# Patient Record
Sex: MALE | Race: WHITE | NOT HISPANIC OR LATINO | Employment: FULL TIME | URBAN - NONMETROPOLITAN AREA
[De-identification: names, ages, dates, MRNs, and addresses within clinical notes are randomized per-mention and may not be internally consistent; named-entity substitution may affect disease eponyms.]

---

## 2019-10-03 RX ORDER — LOSARTAN POTASSIUM 25 MG/1
25 TABLET ORAL DAILY
COMMUNITY

## 2019-10-03 RX ORDER — TRAMADOL HYDROCHLORIDE 50 MG/1
50 TABLET ORAL EVERY 6 HOURS PRN
COMMUNITY

## 2019-10-07 ENCOUNTER — ANESTHESIA (OUTPATIENT)
Dept: GASTROENTEROLOGY | Facility: HOSPITAL | Age: 59
End: 2019-10-07

## 2019-10-07 ENCOUNTER — HOSPITAL ENCOUNTER (OUTPATIENT)
Facility: HOSPITAL | Age: 59
Setting detail: HOSPITAL OUTPATIENT SURGERY
Discharge: HOME | End: 2019-10-07
Attending: INTERNAL MEDICINE | Admitting: INTERNAL MEDICINE

## 2019-10-07 ENCOUNTER — ANESTHESIA EVENT (OUTPATIENT)
Dept: GASTROENTEROLOGY | Facility: HOSPITAL | Age: 59
End: 2019-10-07

## 2019-10-07 VITALS
HEIGHT: 68 IN | WEIGHT: 164 LBS | TEMPERATURE: 97.2 F | DIASTOLIC BLOOD PRESSURE: 73 MMHG | BODY MASS INDEX: 24.86 KG/M2 | HEART RATE: 65 BPM | RESPIRATION RATE: 18 BRPM | SYSTOLIC BLOOD PRESSURE: 120 MMHG | OXYGEN SATURATION: 98 %

## 2019-10-07 DIAGNOSIS — Z12.11 COLON CANCER SCREENING: ICD-10-CM

## 2019-10-07 PROCEDURE — 25010000002 PROPOFOL 10 MG/ML EMULSION: Performed by: NURSE ANESTHETIST, CERTIFIED REGISTERED

## 2019-10-07 PROCEDURE — 88305 TISSUE EXAM BY PATHOLOGIST: CPT | Performed by: INTERNAL MEDICINE

## 2019-10-07 PROCEDURE — 88305 TISSUE EXAM BY PATHOLOGIST: CPT | Performed by: PATHOLOGY

## 2019-10-07 RX ORDER — PROPOFOL 10 MG/ML
VIAL (ML) INTRAVENOUS AS NEEDED
Status: DISCONTINUED | OUTPATIENT
Start: 2019-10-07 | End: 2019-10-07 | Stop reason: SURG

## 2019-10-07 RX ORDER — DEXTROSE AND SODIUM CHLORIDE 5; .45 G/100ML; G/100ML
30 INJECTION, SOLUTION INTRAVENOUS CONTINUOUS PRN
Status: DISCONTINUED | OUTPATIENT
Start: 2019-10-07 | End: 2019-10-07 | Stop reason: HOSPADM

## 2019-10-07 RX ADMIN — PROPOFOL 20 MG: 10 INJECTION, EMULSION INTRAVENOUS at 13:27

## 2019-10-07 RX ADMIN — PROPOFOL 20 MG: 10 INJECTION, EMULSION INTRAVENOUS at 13:26

## 2019-10-07 RX ADMIN — PROPOFOL 20 MG: 10 INJECTION, EMULSION INTRAVENOUS at 13:31

## 2019-10-07 RX ADMIN — DEXTROSE AND SODIUM CHLORIDE 30 ML/HR: 5; 450 INJECTION, SOLUTION INTRAVENOUS at 12:52

## 2019-10-07 RX ADMIN — PROPOFOL 30 MG: 10 INJECTION, EMULSION INTRAVENOUS at 13:29

## 2019-10-07 RX ADMIN — PROPOFOL 50 MG: 10 INJECTION, EMULSION INTRAVENOUS at 13:23

## 2019-10-07 RX ADMIN — PROPOFOL 20 MG: 10 INJECTION, EMULSION INTRAVENOUS at 13:25

## 2019-10-07 RX ADMIN — PROPOFOL 20 MG: 10 INJECTION, EMULSION INTRAVENOUS at 13:30

## 2019-10-07 RX ADMIN — PROPOFOL 40 MG: 10 INJECTION, EMULSION INTRAVENOUS at 13:24

## 2019-10-07 NOTE — H&P
Mainor Rea DO,[facility]  Gastroenterology  Hepatology  Endoscopy  Board Certified in Internal Medicine and gastroenterology  [address], [address]  [place]. [address]  - [phone]   F - [phone]     GASTROENTEROLOGY HISTORY AND PHYSICAL  NOTE   MAINOR REA DO.         SUBJECTIVE:   10/7/2019    Name: Florentino Ferguson  DOD: 1960        Chief Complaint:       Subjective : Screening for colon cancer  Patient is 59 y.o. male presents with desire for elective colonoscopy.      ROS/HISTORY/ CURRENT MEDICATIONS/OBJECTIVE/VS/PE:   Review of Systems:  All systems unremarkable unless specified below.  Constitutional   HENT  Eyes   Respiratory    Cardiovascular  Gastrointestinal   Endocrine  Genitourinary    Musculoskeletal   Skin  Allergic/Immunologic    Neurological    Hematological  Psychiatric/Behavioral    History:     Past Medical History:   Diagnosis Date   • Epididymitis    • Hypertension      History reviewed. No pertinent surgical history.  History reviewed. No pertinent family history.  Social History     Tobacco Use   • Smoking status: Never Smoker   • Smokeless tobacco: Never Used   Substance Use Topics   • Alcohol use: No     Frequency: Never   • Drug use: No     Medications Prior to Admission   Medication Sig Dispense Refill Last Dose   • losartan (COZAAR) 25 MG tablet Take 25 mg by mouth Daily.   10/5/2019   • traMADol (ULTRAM) 50 MG tablet Take 50 mg by mouth Every 6 (Six) Hours As Needed for Moderate Pain .   10/7/2019 at Unknown time     Allergies:  Zithromax [azithromycin]    I have reviewed the patients medical history, surgical history and family history in the available medical record system.     Current Medications:     Current Facility-Administered Medications   Medication Dose Route Frequency Provider Last Rate Last Dose   • dextrose 5 % and sodium chloride 0.45 % infusion  30 mL/hr Intravenous Continuous PRN Mainor Rea DO 30 mL/hr at 10/07/19 1252 30 mL/hr  at 10/07/19 1252       Objective     Physical Exam:   Temp:  [97.9 °F (36.6 °C)] 97.9 °F (36.6 °C)  Heart Rate:  [77] 77  Resp:  [18] 18  BP: (155)/(89) 155/89    Physical Exam:  General Appearance:    Alert, cooperative, in no acute distress   Head:    Normocephalic, without obvious abnormality, atraumatic   Eyes:            Lids and lashes normal, conjunctivae and sclerae normal, no   icterus, no pallor, corneas clear, PERRLA   Ears:    Ears appear intact with no abnormalities noted   Throat:   No oral lesions, no thrush, oral mucosa moist   Neck:   No adenopathy, supple, trachea midline, no thyromegaly, no     carotid bruit, no JVD   Back:     No kyphosis present, no scoliosis present, no skin lesions,       erythema or scars, no tenderness to percussion or                   palpation,   range of motion normal   Lungs:     Clear to auscultation,respirations regular, even and                   unlabored    Heart:    Regular rhythm and normal rate, normal S1 and S2, no            murmur, no gallop, no rub, no click   Breast Exam:    Deferred   Abdomen:     Normal bowel sounds, no masses, no organomegaly, soft        non-tender, non-distended, no guarding, no rebound                 tenderness   Genitalia:    Deferred   Extremities:   Moves all extremities well, no edema, no cyanosis, no              redness   Pulses:   Pulses palpable and equal bilaterally   Skin:   No bleeding, bruising or rash   Lymph nodes:   No palpable adenopathy   Neurologic:   Cranial nerves 2 - 12 grossly intact, sensation intact, DTR        present and equal bilaterally      Results Review:     Lab Results   Component Value Date    WBC 6.3 12/15/2018    HGB 16.6 12/15/2018    HCT 48.8 12/15/2018     12/15/2018             No results found for: LIPASE  No results found for: INR       Radiology Review:  Imaging Results (last 72 hours)     ** No results found for the last 72 hours. **           I reviewed the patient's new clinical  results.  I reviewed the patient's new imaging results and agree with the interpretation.     ASSESSMENT/PLAN:   ASSESSMENT:  1.  Screen for colon cancer    PLAN:  1.  Colonoscopy    Risk and benefits associated with the procedure are reviewed with the patient.  The patient wished to proceed     Justin Goddard DO  10/07/19  1:18 PM

## 2019-10-07 NOTE — ANESTHESIA PREPROCEDURE EVALUATION
Anesthesia Evaluation     Patient summary reviewed and Nursing notes reviewed   no history of anesthetic complications:  NPO Solid Status: > 8 hours  NPO Liquid Status: > 8 hours           Airway   Mallampati: II  TM distance: >3 FB  Neck ROM: full  No difficulty expected  Dental - normal exam     Pulmonary - negative pulmonary ROS and normal exam   Cardiovascular     (+) hypertension,       Neuro/Psych- negative ROS  GI/Hepatic/Renal/Endo - negative ROS     Musculoskeletal (-) negative ROS    Abdominal  - normal exam   Substance History - negative use     OB/GYN negative ob/gyn ROS         Other - negative ROS                       Anesthesia Plan    ASA 2     MAC   total IV anesthesia  intravenous induction   Anesthetic plan, all risks, benefits, and alternatives have been provided, discussed and informed consent has been obtained with: patient.

## 2019-10-07 NOTE — ANESTHESIA POSTPROCEDURE EVALUATION
Patient: Florentino Ferguson    Procedure Summary     Date:  10/07/19 Room / Location:  [facility] ENDOSCOPY 2 / [facility] ENDOSCOPY    Anesthesia Start:  1321 Anesthesia Stop:  1336    Procedure:  COLONOSCOPY (N/A ) Diagnosis:       Colon cancer screening      (Colon cancer screening [Z12.11])    Surgeon:  Justin Goddard DO Provider:  David Love CRNA    Anesthesia Type:  MAC ASA Status:  2          Anesthesia Type: MAC  Last vitals  BP   155/89 (10/07/19 1242)   Temp   97.9 °F (36.6 °C) (10/07/19 1242)   Pulse   77 (10/07/19 1242)   Resp   18 (10/07/19 1242)     SpO2   99 % (10/07/19 1242)     Post Anesthesia Care and Evaluation    Patient location during evaluation: bedside  Patient participation: complete - patient participated  Level of consciousness: awake and alert  Pain score: 0  Pain management: adequate  Airway patency: patent  Anesthetic complications: No anesthetic complications  PONV Status: none  Cardiovascular status: acceptable  Respiratory status: acceptable  Hydration status: acceptable

## 2019-10-08 LAB
LAB AP CASE REPORT: NORMAL
PATH REPORT.FINAL DX SPEC: NORMAL
PATH REPORT.GROSS SPEC: NORMAL

## 2023-08-23 ENCOUNTER — OFFICE VISIT (OUTPATIENT)
Dept: OTOLARYNGOLOGY | Facility: CLINIC | Age: 63
End: 2023-08-23
Payer: COMMERCIAL

## 2023-08-23 VITALS — HEIGHT: 68 IN | HEART RATE: 94 BPM | BODY MASS INDEX: 25.61 KG/M2 | OXYGEN SATURATION: 99 % | WEIGHT: 169 LBS

## 2023-08-23 DIAGNOSIS — H69.92 EUSTACHIAN TUBE DISORDER, LEFT: ICD-10-CM

## 2023-08-23 DIAGNOSIS — H93.12 TINNITUS AURIUM, LEFT: Primary | ICD-10-CM

## 2023-08-23 NOTE — PROGRESS NOTES
Chief complaint: recurrent ear infections    Assessment and Plan:  63-year-old male with history consistent with intermittent eustachian tube dysfunction on the left, ears healthy today    -I will have him follow-up with an audiogram to assess for underlying hearing loss  -We discussed that I suspect his intermittent eustachian tube dysfunction causing his symptoms, the ears are without evidence of any disease today  -We discussed that when he has symptoms I would like him to return to clinic for evaluation, I suspect that he does not truly have infections during the symptomatic.    History of present illness:    Mr. Ferguson is a 63-year-old male presented today for evaluation of recurrent ear infections.  He states that his symptoms are intermittent ear fullness with associated ringing without hearing changes without vertigo or ear drainage lasting several days.  This all started after he had a dental extraction was placed on amoxicillin for prophylaxis.  He is concerned that the amoxicillin has caused the intermittent tinnitus.  He denies a history of ear surgery or recurrent ear infections.  He does not have a history of associated nasal congestion, allergies.  He states that when he has the ear fullness, there is an associated dull ache usually in the left ear and that high-frequency ringing that is not pulsatile.  He currently does not have any ear symptoms.  He states that his symptoms resolved about 2 days ago and he has been feeling well since then.  He has not been febrile.  Chills.  No other acute ENT concerns today.    Vital Signs   Vitals:    08/23/23 0828   Pulse: 94   SpO2: 99%     Physical Exam:  General: NAD, awake and alert  Head: normocephalic, atraumatic  Eyes: EOMI, sclerae white, conjunctivae pink  Ears: pinnae intact without masses or lesions   Right: TM intact without injection or effusion   Left: TM intact without injection or effusion  Nose: external straight without deformity   Mucosa pink,  not edematous. No polyps seen. No purulence.   Septum: midline   Turbinates: not hypertrophied  OC/OP: mucosa moist and pink, no masses or lesions, tongue is midline and mobile. Tonsils 2+ without exudate. Uvula single and elevates symmetrically.  Neck: supple, no masses or lesions. Thyroid without palpable masses.  Neuro: CN II - XII grossly intact, no focal deficits    Results Review:  Referring physician note from 8/15/2023 reviewed today and demonstrates complaint of ear fullness previously diagnosed with labyrinthitis and treated with Cortisporin.  No dizziness, no tinnitus, no ear drainage.  Diminished hearing and fullness noted.  On exam the bilateral tympanic membrane's were noted to be erythematous and bulging with decreased mobility.  Rocephin was given and Menton prescribed with a prednisone taper.    Review of Systems:  Positive ROS items: None noted  Otherwise, a 14 system ROS is negative except as pertinent positives are mentioned above.    Histories:  Allergies   Allergen Reactions    Zithromax [Azithromycin] Dizziness       Prior to Admission medications    Medication Sig Start Date End Date Taking? Authorizing Provider   losartan (COZAAR) 25 MG tablet Take 25 mg by mouth Daily.    Provider, MD David   traMADol (ULTRAM) 50 MG tablet Take 50 mg by mouth Every 6 (Six) Hours As Needed for Moderate Pain .    Provider, MD David       Past Medical History:   Diagnosis Date    Epididymitis     Hypertension        Past Surgical History:   Procedure Laterality Date    COLONOSCOPY N/A 10/7/2019    Procedure: COLONOSCOPY;  Surgeon: Justin Goddard DO;  Location: [facility] ENDOSCOPY;  Service: Gastroenterology       Social History     Socioeconomic History    Marital status:    Tobacco Use    Smoking status: Never    Smokeless tobacco: Never   Substance and Sexual Activity    Alcohol use: No    Drug use: No    Sexual activity: Defer       No family history on file.    Immunization status not  specifically asked and therefore not specifically documented.    Voice dictation disclaimer:  Voice dictation was used in the creation of this note.  As such, there may be typos or inappropriate words throughout the document.  The document is proofread for typos and errors, however some may not be caught.      This document has been electronically signed by Silvina Sanchez MD on August 23, 2023 07:45 CDT

## 2023-08-30 ENCOUNTER — CLINICAL SUPPORT (OUTPATIENT)
Dept: AUDIOLOGY | Facility: CLINIC | Age: 63
End: 2023-08-30
Payer: COMMERCIAL

## 2023-08-30 ENCOUNTER — OFFICE VISIT (OUTPATIENT)
Dept: OTOLARYNGOLOGY | Facility: CLINIC | Age: 63
End: 2023-08-30
Payer: COMMERCIAL

## 2023-08-30 VITALS — BODY MASS INDEX: 25.61 KG/M2 | WEIGHT: 169 LBS | HEIGHT: 68 IN | HEART RATE: 65 BPM | OXYGEN SATURATION: 97 %

## 2023-08-30 DIAGNOSIS — H69.83 ETD (EUSTACHIAN TUBE DYSFUNCTION), BILATERAL: ICD-10-CM

## 2023-08-30 DIAGNOSIS — H90.A31 MIXED CONDUCTIVE AND SENSORINEURAL HEARING LOSS OF RIGHT EAR WITH RESTRICTED HEARING OF LEFT EAR: Primary | ICD-10-CM

## 2023-08-30 DIAGNOSIS — H93.12 TINNITUS AURIUM, LEFT: Primary | ICD-10-CM

## 2023-08-30 DIAGNOSIS — H93.13 TINNITUS OF BOTH EARS: ICD-10-CM

## 2023-08-30 DIAGNOSIS — H65.93 RECURRENT SEROUS OTITIS MEDIA OF BOTH EARS: ICD-10-CM

## 2023-08-30 DIAGNOSIS — H93.8X3 SENSATION OF FULLNESS IN BOTH EARS: ICD-10-CM

## 2023-08-30 DIAGNOSIS — H69.83 DYSFUNCTION OF BOTH EUSTACHIAN TUBES: ICD-10-CM

## 2023-08-30 PROCEDURE — 92567 TYMPANOMETRY: CPT | Performed by: AUDIOLOGIST

## 2023-08-30 PROCEDURE — 92557 COMPREHENSIVE HEARING TEST: CPT | Performed by: AUDIOLOGIST

## 2023-08-30 NOTE — PROGRESS NOTES
Follow up Regarding: Tinnitus, intermittent ETD    Assessment and Plan:  63-year-old male with persistent bilateral eustachian tube dysfunction, intermittent recurrent serous otitis media bilaterally, bilateral tinnitus with mild mixed hearing loss bilaterally    -We discussed continued expectant management versus persistent nasal steroid use versus tympanostomy tube placement.  The patient is amenable to bilateral tube placement in clinic under local anesthesia.  We did discuss the risks of left chance of perforation, chance of cholesteatoma, and the chance that this does not resolve his tinnitus and he continues to have symptoms.  All questions regarding the procedure were answered at today's visit.  -Follow-up for procedure visit to place bilateral tubes    History of present illness/Interval history:    Mr. Ferguson is a 63 male presenting today for follow-up regarding eustachian tube dysfunction, he continues to note tinnitus bilaterally that is mild but bothersome.  He also notes he can hear swishing in the right ear when he opens his jaw, this has been present since his last appointment.  He denies any drainage from either ear, does not notice any difficulty hearing or understanding.  He is on a nasal steroid and takes this daily and has for several months and has not noticed a change in his ears.  No new or acute concerns today.    Physical Exam:  General: NAD, awake and alert  Head: normocephalic, atraumatic  Eyes: EOMI, sclerae white, conjunctivae pink  Ears: pinnae intact without masses or lesions, unable to auto insufflate bilaterally   Right: TM intact without injection, small effusion present inferiorly   Left: TM intact without injection or effusion  Nose: external straight without deformity   Mucosa pink, not edematous. No polyps seen. No purulence.   Septum: Grossly midline   Turbinates: not hypertrophied  OC/OP: mucosa moist and pink  Neuro: no focal deficits    Vital Signs   Vitals:    08/30/23 1010    Pulse: 65   SpO2: 97%     Results Review:  Previous clinic visit from 8/23/2023 reviewed today and demonstrates at that time likely intermittent eustachian tube dysfunction with ears healthy at that time, complaint of left-sided tinnitus recommendations were to follow-up with an audiogram to assess.    Audiogram and tympanometry from 8/30/2023 reviewed today and demonstrates: Type B small volume tympanogram on the right, type a tympanogram on the left.  SRT 20 dB on the right with 100% discrimination.  Tones demonstrate normal sloping to mild mixed hearing loss.  SRT 15 dB on the left with 100% discrimination.  Tones demonstrate normal sloping to mild mixed hearing loss.    Histories:  Allergies   Allergen Reactions    Zithromax [Azithromycin] Dizziness       Prior to Admission medications    Medication Sig Start Date End Date Taking? Authorizing Provider   losartan (COZAAR) 25 MG tablet Take 1 tablet by mouth Daily.    Provider, MD David   traMADol (ULTRAM) 50 MG tablet Take 1 tablet by mouth Every 6 (Six) Hours As Needed for Moderate Pain.    Provider, MD David       Past Medical History:   Diagnosis Date    Epididymitis     Hypertension        Past Surgical History:   Procedure Laterality Date    COLONOSCOPY N/A 10/7/2019    Procedure: COLONOSCOPY;  Surgeon: Justin Goddard DO;  Location: [facility] ENDOSCOPY;  Service: Gastroenterology       Social History     Socioeconomic History    Marital status:    Tobacco Use    Smoking status: Never    Smokeless tobacco: Never   Substance and Sexual Activity    Alcohol use: No    Drug use: No    Sexual activity: Defer       No family history on file.    10 point ROS asked and negative unless otherwise noted in HPI.    Immunization status not specifically asked and therefore not specifically documented.    Voice dictation disclaimer:  Voice dictation was used in the creation of this note.  As such, there may be typos or inappropriate words throughout  the document.  The document is proofread for typos and errors, however some may not be caught.      This document has been electronically signed by Silvina Sanchez MD on August 30, 2023 09:32 CDT

## 2023-08-30 NOTE — PROGRESS NOTES
STANDARD AUDIOMETRIC EVALUATION      Name:  Florentino Ferguson  :  1960  Age:  63 y.o.  Date of Evaluation:  2023      HISTORY    Reason for visit:  Florentino Ferguson is seen today for a hearing test at the request of Dr. Silvina Sanchez.  Patient reports he's had ear problems for the past 5 weeks, including ear fullness and tinnitus.  He states his right ear seems worse than his left ear.        EVALUATION    See Audiogram    RESULTS        Otoscopy and Tympanometry 226 Hz :  Right Ear:  Otoscopy:  Clear ear canal          Tympanometry:  Reduced pressure and compliance     Left Ear:   Otoscopy:  Clear ear canal        Tympanometry:  Shallow eardrum mobility    Test technique:  Standard Audiometry     Pure Tone Audiometry:   Patient responded to pure tones at 20-30 dB for 250-8000 Hz in right ear, and at 10-35 dB for 250-8000 Hz in left ear.       Speech Audiometry:        Right Ear:  Speech Reception Threshold (SRT) was obtained at 20 dBHL                 Speech Discrimination scores were 100% in quiet when words were presented at 60 dBHL       Left Ear:  Speech Reception Threshold (SRT) was obtained at 15 dBHL                 Speech Discrimination scores were 100% in quiet when words were presented at 55 dBHL    Reliability:   good    IMPRESSIONS:  1.  Tympanometry results are consistent with Reduced pressure and compliance  in right ear, and Shallow eardrum mobility in left ear.  2.  Pure tone results are consistent with borderline within normal limits to mild flat mixed hearing loss for right ear, and within normal limits to mild sloping   hearing loss in left ear with conductive component at 4000 Hz.       RECOMMENDATIONS:  Patient is seeing the Ear Nose and Throat physician immediately following this examination.  It was a pleasure seeing Florentino Ferguson in Audiology today.  We would be happy to do further testing or discuss these test as necessary.          This document has been electronically signed  by Teri Rangel MS CCC-A on August 30, 2023 15:55 CDT       Teri Rangel MS CCC-A  Licensed Audiologist

## 2023-09-25 ENCOUNTER — PROCEDURE VISIT (OUTPATIENT)
Dept: OTOLARYNGOLOGY | Facility: CLINIC | Age: 63
End: 2023-09-25

## 2023-09-25 VITALS — HEIGHT: 68 IN | WEIGHT: 169.2 LBS | OXYGEN SATURATION: 99 % | HEART RATE: 77 BPM | BODY MASS INDEX: 25.64 KG/M2

## 2023-09-25 DIAGNOSIS — H65.93 RECURRENT SEROUS OTITIS MEDIA OF BOTH EARS: ICD-10-CM

## 2023-09-25 DIAGNOSIS — H69.93 ETD (EUSTACHIAN TUBE DYSFUNCTION), BILATERAL: Primary | ICD-10-CM

## 2023-09-25 PROCEDURE — 69433 CREATE EARDRUM OPENING: CPT | Performed by: OTOLARYNGOLOGY

## 2023-09-25 NOTE — PROGRESS NOTES
Procedure:   1. bilateral myringotomy with tympanostomy tube placement    Florentino Ferguson  9/25/2023    Indications:  63M with chronic ETD presenting today for tubes as discussed previously.    Pre-procedure Diagnoses:  ETD  Post-procedure Diagnoses:   same    Findings:  1. Tympanic membranes injected  2. No middle ear effusion  3. Saeed grommettubes in place and patent bilaterally at the conclusion of the procedure  4. Cerumen within canals bilaterally, removed, thin film against TM bilaterally    Specimen(s) Removed: None  Anesthesia: Topical  Surgeon: Silvina Sanchez MD  Assistant: None  Complications: none  Estimated Blood Loss: None    Description of Procedure:  After informed consent was obtained the patient was placed in a reclined position with the head turned to the contralateral side. Under microscopic visualization using a 4 mm ear speculum, Phenol was applied to the anterior inferior tympanic membrane for anesthesia. Myringotomy was made in the anterior inferior quadrant of the tympanic membrane. Paparella tympanostomy tube was placed within the myringotomy. Ciprofloxacin drops were then instilled within the right ear and a cotton placed at the external auditory canal meatus. The same was performed on the contralateral side. This concluded the procedure. The patient tolerated the procedure well without and known complication.     Disposition:  Keep dirty water out of the ears and follow-up in 6 months for routine tympanostomy tube check , sooner if problems

## (undated) DEVICE — SINGLE-USE BIOPSY FORCEPS: Brand: RADIAL JAW 4

## (undated) DEVICE — CANN SMPL SOFTECH BIFLO ETCO2 A/M 7FT